# Patient Record
(demographics unavailable — no encounter records)

---

## 2024-12-19 NOTE — ASSESSMENT
[FreeTextEntry1] :  73M with BPH presenting s/p bilateral PAE with NBCA 6/6/23, 75cc prostate intermittently on alfuzosin, being followed for elevated psa s/p negative biopsies x 2  LUTS/BPH s/p PAE (n-BCA 6/6/23) - considering median lobe TURP - Still with LUTS, primarily urgency and slow stream - Discussed median lobe/high bladder neck seen on MRI (present pre-PAE as well), could be the primary contributor to his urgency symptoms - Management options discussed including restarting medications vs. TUR-BN/Median lobe - stopped uroxatral due to urge incontinence with medication although it improved his flow, had Retrograde ejaculation in past with Flomax, - switch to rapaflo, not taking rapaflo at the moment, worried about hematospermia and had some retrograde ejaculation with the medication - occasionally has urge incontinence, he will try myrbetriq 25mg daily now - check ua/culture to rule out UTI 12/19/24   Elevated PSA - Post PAE dropped to 5.36 > 10.30 now 7.81 - select MDx discussed, elevated risk - There is a nonspecific right pzpl lesion that did not appear to be sampled on prior biopsy - 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 Brown to reassess treatment plan.   Thank you very much for allowing me to assist in the care of this patient. Please do not hesitate to contact me with any additional questions or concerns.

## 2024-12-19 NOTE — HISTORY OF PRESENT ILLNESS
[FreeTextEntry1] : Dear Joanna Hennessy Dear Shawn Renae  Thank you so much for the referral to help care for your patient. Chief Complaint BPH / PSA CaP Risk Date of first visit: 03/27/2023  Job Najera is a 73-year-old  man with PMHx MVR/afib s/p ablation, BPH, nephrolithiasis. Pt has an enlarged prostate w/ a hx of bladder stones s/p cystolithopaxy. BPH symptoms have been occurring for over a decade. Most bothersome symptoms include urgency, urge incontinence, weak stream, and incomplete emptying. Does not use pads. He has been on medical therapy for ~5 years. Did not like retrograde ejaculation with Flomax. Currently on alfuzosin. Denies dysuria, hematuria, hx UTI, retention or kidney failure.  s/p b/l PAE 6/6/2023. He has noticed improvement since PAE. He reports he doesn't search for a bathroom and has noticed improvement in his urinary stream, however he still has mild urinary complaints of frequency and urgency He is not consistent with taking alfuzosin. 6 months post PAE, still with some symptoms of urgency, small volume voids.  His father has a hx of prostate cancer s/p brachytherapy.  Select Mdx 01/22/2024 Elevated Risk 35% [place] 7 or higher prostate cancer 28% [place] 6 prostate cancer  PSA Hx 7.81 01/22/2024 PSAD 0.10 10.30 1/16/2024 5.36 07/28/2023 9.70 12/23/2022 9.04 06/15/2022  MRI Hx: MRI at [facility] on 01/04/2024. 76.4 cc prostate with PIRADS 2 Prior abnormality in the left posterior lateral PZ has resolved-probably represented inflammation/prostatitis. No LAD No EPE, No Bony Lesions. The images have been reviewed and clinical implications discussed with the patient. On review, there is a nonspecific right pzpl lesion (P3), the right apex TZa (2)  MRI at [facility] on 01/03/2023. 73.1ml prostate with PIRADS 4 lesion measuring 12mm in the Left posterolateral peripheral zone mid gland to base. No LAD No EPE, No Bony Lesions. The images have been reviewed and clinical implications discussed with the patient.  Xray Hx: Xray Chest 05/24/2024- No acute infiltrates  Xray Chest 11/03/2023- Heart size within normal limits, mitral annuloplasty, thoracic aortic calcification. Lungs and mediastinum are unremarkable. Thoracic spine degenerative changes.  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 Alcantara negative  12/19/24  IPSS 16 QOL  5  MONSE 10  03/06/2024 IPSS 15 QOL 4 MONSE 12  01/22/2024 IPSS 14 QOL 4 MONSE 12 Flow/PVR: Qmax 2.6 ml/s, Qavg 2.6 ml/s, vv 56.5ml; PVR = 79 ml  07/05/2023 IPSS 13 QOL 3 MONSE 15 Flow/PVR: unable to void in office d/t voided prior to visit; PVR= 57 ml  03/27/2023 IPSS 20 QOL 4 MONSE 14  The patient denies fevers, chills, nausea and or vomiting and no unexplained weight loss.  All pertinent laboratory, films and physician notes were reviewed. Questionnaire results were discussed with patient.

## 2025-06-27 NOTE — ASSESSMENT
[FreeTextEntry1] : Assessment: LLUVIA PANTOJA is a 74-year-old M with a history of kidney stone disease with possible new stone growth.  h/o also significant for BPH presenting s/p bilateral PAE with NBCA 6/6/23 with new urinary urgency, and elevated PSA.  Plan: -Advised to restart mirabegron for urgency -[facility] -Continue PSA screening with Dr. Brown as scheduled (next Dec 2024) -Next renal ultrasound in 6 months -Continue with generalized stone prevention strategies as previously discussed (increase fluid intake to keep urine clear or very faint yellow, limit dietary salt intake, increase fruits and vegetables, limit high oxalate foods, maintain normal dietary calcium, and moderation of non-dairy animal protein).

## 2025-06-27 NOTE — HISTORY OF PRESENT ILLNESS
[FreeTextEntry1] : Name LLUVIA PANTOJA MRN [id number]  Jul 3  ------------------------------------------------------------------------------------------------------------------------------------------- Date of First Visit: 6/15/22 Referring Provider/PCP: Phil Rincon ------------------------------------------------------------------------------------------------------------------------------------------- CC: kidney stones, enlarged prostate/ lower urinary tract symptoms (LUTS)  History of Present Illness: LLUVIA PANTOJA is a 71 year M who presents for evaluation of kidney stones. He presented to the [facility] ED on  with left flank pain that was intermittently severe. CT demonstrated an 8mm left UVJ stone with mild hydro. Creatinine was 1.36. He was discharged home with pain management. Patient believes he passed the stone despite not seeing it in his urine as his pain has completely resolved. This is how he felt after passing a previous stone several years ago. Patient is interested in treating his non-obstructing stones on the right side. Of note, patient has a history of enlarged prostate with bladder stone s/p cystolitholapaxy by Dr. Ruben Contreras. No previous prostate surgeries. Currently takes Tamsulosin (had been on Alfuzosin but meds were switched by PMD for unclear reason). Also recently prescribed Dutasteride but has only taken a few doses sparingly. Patient is mostly bothered by irritative symptoms: urinary frequency, urgency, and occasional urge incontinence. Also mildly bothered by retrograde ejaculation. PVR 45 cc. Patient also has history of elevated PSA. Prostate MRI (ordered by Dr. Rodriguez) was unremarkable for any lesions and he did not have any biopsies.  Imaging: CT scan from 2022 can be found in [facility] PACS. Findings: Three nonobstructing right renal calculi up to 0.9 cm diameter. There are three nonobstructing left renal calculi up to 0.3 cm diameter. Mild left hydronephrosis and left hydroureter due to a 0.8 cm calculus at the left UVJ. There is left perinephric urinoma present.  Previous urine cultures: 2022: Negative  Kidney Stone History: First-time stone former - No Concurrent asymptomatic stone(s) - Yes Previous stone surgeries - No Previous passed stones - Yes Comorbidities - non-contributory Family history of kidney stones - no Previous metabolic evaluation - no ------------------------------------------------------------------------------------------------------------------------------------------- Interval History (2022): Patient presents for follow up for elevated PSA, LUTS, and kidney stones.  PSA history: : 7 2015: 2.19 2019: 7.7 20: 9.93 20: 7.2  MRI on 11/3/20 that indicated "No cancerous lesions. Volume 85cc, Density 0.11" 2022: 9.04. Will plan to repeat PSA in 6 months.  He has been on Alfuzosin for management of LUTS. Stopped tamsulosin and dutasteride. Urinary symptoms have improved. Still has some urinary frequency and urgency. He recently underwent a successful cardiac ablation Wednesday 8/10. He is still on Eliquis, and will be on it for about 3 months. He does not believe he passed the left ureteral stone found in May 2022 as he has not seen the stone and experiences some intermittent mild discomfort. He would like to proceed with scheduling a URS/LL to treat the obstructing stone and will likely schedule another staged procedure to treat the nonobstructing stones. Of note, he has air travel planned for  and . ------------------------------------------------------------------------------------------------------------------------------------------- CT [facility] from 11/3/2022. Findings: No hydronephrosis is evident. Three nonobstructing right renal calculi up to 0.9 cm diameter. The stone burden for the right kidney is unchanged. The largest calculus in the right kidney has a density measurement of 1,108 Hounsfield units on non-MIP images. Two nonobstructing left renal calculi up to 0.3 cm diameter. The stone burden for the left kidney has decreased by one. Probable 1.9 cm parapelvic cyst lower pole left kidney. No ureteric calculus. Bilateral renal sinus lipomatosis. ------------------------------------------------------------------------------------------------------------------------------------------- Interval History (2022): patient is s/p left ureteroscopy with basket stone extraction, right ureteroscopy with laser lithotripsy and CVAC, cystolitholapaxy, bilateral ureteral stent insertion yesterday 11/10/22. Patient follows up today because he has been having increasing difficulty with urination, hematuria, and right-sided flank pain since overnight. States he was feeling largely well and appropriate for postop at the time of leaving the surgery center. Denies any fevers or chills. In the office today, complains of weak stream. Hematuria has improved. PVR: 100 cc. Continues to endorse significant bilateral flank pain with heat during urinary urgency and attempted voiding. ------------------------------------------------------------------------------------------------------------------------------------------- Interval History (2022): LLUVIA PANTOJA presents s/p bilateral ureteroscopy with laser lithotripsy and stent placement on 11/10/2022. He tolerated the procedure well and was discharged home on the same day. He presented for follow up the following day on  for difficulty with urination, hematuria, and right sided flank pain. Patient presents today for stent removal and postoperative follow-up. He feels well. Denies fever, chills, nausea, vomiting. Mod stent-related symptoms - flank pain with urination, hematuria, urgency, dysuria, lower abdominal discomfort after urination.  Procedure: Stent was left on a string and removed in the office today without difficulty. Stent was intact. Patient tolerated the procedure well. ------------------------------------------------------------------------------------------------------------------------------------------- Interval History (2022): Patient presents for 1-month follow up and renal ultrasound after bilateral URS/LL on 11/10/2022. Doing well, no complaints. Ultrasound performed in the office today demonstrates no evidence of hydronephrosis bilaterally. No stones on the left. On the right, there is a punctate midpole stone and a 0.3 cm midpole stone. He has continued Alfuzosin 10 mg daily. Some days urinary frequency is worse than he would like it to be and occasionally has leakage. He has been considering procedures to treat the prostate.  Stone composition: 100% Calcium oxalate monohydrate Stone culture: N/A  Results of 24-hour urine analysis (completed 2022) demonstrate: -Adequate urine volume: 2.09 L/day -Elevated urinary sodium: 226 mmol/day -Normal urinary calcium: 165 mg/day -Normal urinary oxalate: 28 mg/day -Hypocitraturia: 354 mg/day -PCR: 1.1 mg/kg/day -Hyperuricosuria: 0.984 g/day -Urinary pH: 6.040 ---------------------------------------------------------------------------------------------------------------------------------------------------------------- Interval History (2023): Patient presents for follow-up kidney stone surveillance visit with renal ultrasound. No recent stone-related events. Working well on abiding by dietary stone prevention measures. Ultrasound performed in the office today demonstrates no evidence of hydronephrosis bilaterally. Re-demonstration of a stable 2 mm right midpole stone.  LUTS (urinary freq) are improving s/p PAE 6 weeks ago with Dr. Brown. Scheduled to see Dr. Alcantara in 2 weeks for ePSA surveillance. ---------------------------------------------------------------------------------------------------------------------------------------------------------------- Interval History (2024): Patient presents for follow-up kidney stone surveillance visit with renal ultrasound. Doing well, no complaints. No recent stone-related events. Ultrasound performed in the office today demonstrates no evidence of hydronephrosis bilaterally. Echogenic focus in RLP 2 mm (stable known stone); 4 mm focus in RMP, but peripheral and suspect parenchymal calcification ------------------------------------------------------------------------------------------------------------------------------------------- Interval History (2025): US today shows possible new stone growth - echogenic foci: RLP 3.2 mm (from 2), RMP parench 4.7, LUP/MP 2.3 mm (new) Due for follow up with Dr. Brown for LUTS check. Good flow. Has had some increased urinary urgency over the past 2 months with occasional UI. Not currently on BPH/LUTS meds but has silodosin and mirabegron at home.